# Patient Record
Sex: MALE | Race: WHITE | Employment: FULL TIME | URBAN - METROPOLITAN AREA
[De-identification: names, ages, dates, MRNs, and addresses within clinical notes are randomized per-mention and may not be internally consistent; named-entity substitution may affect disease eponyms.]

---

## 2022-06-11 ENCOUNTER — APPOINTMENT (OUTPATIENT)
Dept: GENERAL RADIOLOGY | Age: 22
End: 2022-06-11
Payer: COMMERCIAL

## 2022-06-11 ENCOUNTER — HOSPITAL ENCOUNTER (EMERGENCY)
Age: 22
Discharge: HOME | End: 2022-06-11
Attending: EMERGENCY MEDICINE
Payer: COMMERCIAL

## 2022-06-11 VITALS
HEIGHT: 74 IN | HEART RATE: 98 BPM | DIASTOLIC BLOOD PRESSURE: 76 MMHG | RESPIRATION RATE: 18 BRPM | BODY MASS INDEX: 27.56 KG/M2 | SYSTOLIC BLOOD PRESSURE: 129 MMHG | OXYGEN SATURATION: 98 % | WEIGHT: 214.73 LBS | TEMPERATURE: 100.9 F

## 2022-06-11 DIAGNOSIS — B34.9 VIRAL SYNDROME: Primary | ICD-10-CM

## 2022-06-11 DIAGNOSIS — R50.9 FEVER, UNSPECIFIED FEVER CAUSE: ICD-10-CM

## 2022-06-11 LAB — SARS-COV-2, NAAT: NOT DETECTED

## 2022-06-11 PROCEDURE — 6370000000 HC RX 637 (ALT 250 FOR IP): Performed by: EMERGENCY MEDICINE

## 2022-06-11 PROCEDURE — 87635 SARS-COV-2 COVID-19 AMP PRB: CPT

## 2022-06-11 PROCEDURE — 71046 X-RAY EXAM CHEST 2 VIEWS: CPT

## 2022-06-11 PROCEDURE — 99284 EMERGENCY DEPT VISIT MOD MDM: CPT

## 2022-06-11 RX ORDER — ONDANSETRON 4 MG/1
4 TABLET, ORALLY DISINTEGRATING ORAL ONCE
Status: COMPLETED | OUTPATIENT
Start: 2022-06-11 | End: 2022-06-11

## 2022-06-11 RX ORDER — HYDROCODONE BITARTRATE AND ACETAMINOPHEN 5; 325 MG/1; MG/1
1 TABLET ORAL EVERY 4 HOURS PRN
Qty: 15 TABLET | Refills: 0 | Status: SHIPPED | OUTPATIENT
Start: 2022-06-11 | End: 2022-06-11 | Stop reason: SDUPTHER

## 2022-06-11 RX ORDER — ACETAMINOPHEN 500 MG
1000 TABLET ORAL ONCE
Status: COMPLETED | OUTPATIENT
Start: 2022-06-11 | End: 2022-06-11

## 2022-06-11 RX ORDER — HYDROCODONE BITARTRATE AND ACETAMINOPHEN 5; 325 MG/1; MG/1
1 TABLET ORAL EVERY 4 HOURS PRN
Qty: 15 TABLET | Refills: 0 | Status: SHIPPED | OUTPATIENT
Start: 2022-06-11 | End: 2022-06-14

## 2022-06-11 RX ADMIN — ONDANSETRON 4 MG: 4 TABLET, ORALLY DISINTEGRATING ORAL at 14:25

## 2022-06-11 RX ADMIN — ACETAMINOPHEN 1000 MG: 500 TABLET ORAL at 14:32

## 2022-06-11 ASSESSMENT — PAIN SCALES - GENERAL
PAINLEVEL_OUTOF10: 5
PAINLEVEL_OUTOF10: 5
PAINLEVEL_OUTOF10: 0
PAINLEVEL_OUTOF10: 5

## 2022-06-11 ASSESSMENT — PAIN DESCRIPTION - DESCRIPTORS: DESCRIPTORS: ACHING

## 2022-06-11 ASSESSMENT — PAIN - FUNCTIONAL ASSESSMENT: PAIN_FUNCTIONAL_ASSESSMENT: 0-10

## 2022-06-11 ASSESSMENT — PAIN DESCRIPTION - LOCATION: LOCATION: HEAD

## 2022-06-11 NOTE — ED TRIAGE NOTES
Pt presents to ED ambulatory with father with c/o of headaches, congestion, and fever x4 days. Has not been tested for COVID. Reports only medication taken today is 500mg tylenol at 0500 this morning. +febrile in ED. All other vitals stable. Resp even and unlabored. A/ox4. No acute distress noted. Denies any need at this time. Call light within reach. Bed in lowest position. Will continue to monitor.

## 2022-06-11 NOTE — ED NOTES
Pt. C/o being nauseated, afraid to take Tylenol, Dr. Gloria Paredes made aware     Caryl Guillen, NESHA  06/11/22 [id number]

## 2022-06-11 NOTE — ED PROVIDER NOTES
[address] [facility]  EMERGENCY DEPARTMENTENCOUNTER      Pt Name: Ifrah Asencio  MRN: [id number]  [place] 2000  Date ofevaluation: 6/11/2022  Provider: Vonda Schmidt MD    CHIEF COMPLAINT       Chief Complaint   Patient presents with    Fever     +congestion. x4 days. +headache. states took one 500mg tylenol at 0500 this morning       HPI    HISTORY OF PRESENT ILLNESS   (Location/Symptom, Timing/Onset,Context/Setting, Quality, Duration, Modifying Factors, Severity)  Note limiting factors. Ifrah Asencio is a 25 y.o. male who presents to the emergency department with a fever. This is a 72-year-old male who presents with a fever and body aches he has had for the last 4 days. The patient has had some congestion and headaches as well. He took some Tylenol this morning. He denies any significant cough. The patient has been tested for COVID recently that was negative. He denies any abdominal pain. He denies any other complaints. NursingNotes were reviewed. Review of Systems    REVIEW OF SYSTEMS    (2-9 systems for level 4, 10 or more for level 5)     Review of Systems   Constitutional: Positive for fever. HENT: Negative for rhinorrhea and sore throat. Eyes: Negative for redness. Respiratory: Negative for shortness of breath. Cardiovascular: Negative for chest pain. Gastrointestinal: Negative for abdominal pain. Genitourinary: Negative for flank pain. Neurological: Negative for headaches. Hematological: Negative for adenopathy. Psychiatric/Behavioral: Negative for confusion. Except as noted above the remainder of the review of systems was reviewed and negative. PAST MEDICAL HISTORY     Past Medical History:   Diagnosis Date    Headache     Ulcerative colitis ([facility], [place] [address]          SURGICALHISTORY     History reviewed. No pertinent surgical history.       CURRENT MEDICATIONS       Previous Medications    ADALIMUMAB (HUMIRA SC) (up to 7 for level 4, 8 or more for level 5)     ED Triage Vitals [06/11/22 1319]   BP Temp Temp Source Heart Rate Resp SpO2 Height Weight   124/81 (!) 101 °F (38.3 °C) Oral 92 20 98 % 6' 2\" (1.88 m) 214 lb 11.7 oz (97.4 kg)       Physical Exam:      General Appearance:  Alert, cooperative, appears stated age. Head:  Normocephalic, without obvious abnormality, atraumatic. Eyes:  conjunctiva/corneas clear, EOM's intact. Sclera anicteric. ENT:  Mucous remains are moist and pink   Neck: Supple, symmetrical, trachea midline, no adenopathy. No jugular venous distention. Lungs:    Clear to auscultation bilaterally   Chest Wall:   No pain to palpation   Heart:   Genitourinary:  Regular rate rhythm with no murmurs rubs gallops  Deferred   Abdomen:    Soft and benign. No guarding or rebound. Sent. Extremities:  No clubbing cyanosis or edema   Pulses:  Good throughout   Skin:  No rashes or lesions to exposed skin. Neurologic: Alert and oriented X 3. DIAGNOSTIC RESULTS         RADIOLOGY:   Non-plain filmimages such as CT, Ultrasound and MRI are read by the radiologist. Plain radiographic images are visualized and preliminarily interpreted by the emergency physician with the below findings:    See below    Interpretation per the Radiologist below, if available at the time ofthis note: All incidental findings were discussed with the patient. XR CHEST (2 VW)   Final Result   No significant findings in the chest.               ED BEDSIDE ULTRASOUND:   Performed by ED Physician - none    LABS:  Labs Reviewed   COVID-19, RAPID       All other labs were within normal range or not returned as of this dictation.     EMERGENCY DEPARTMENT COURSE and DIFFERENTIAL DIAGNOSIS/MDM:   Vitals:    Vitals:    06/11/22 1319   BP: 124/81   Pulse: 92   Resp: 20   Temp: (!) 101 °F (38.3 °C)   TempSrc: Oral   SpO2: 98%   Weight: 214 lb 11.7 oz (97.4 kg)   Height: 6' 2\" (1.88 m)           MDM    The patient has remained stable throughout his hospital course. He is not appear to be septic. He is well oxygenated with a normal heart rate. His work-up included a chest x-ray that shows no acute findings and a COVID-19 test that was negative. Patient also has some minimally red eyes especially on the left. This appears to be consistent with a viral syndrome with fever. He does not appear to be toxic. The patient will be discharged with some hydrocodone for his body aches and relief. He is to take Motrin as well. He was referred back to his primary physician in the next several days and instructed to return if worse. Is this patient to be included in the SEP-1 Core Measure due to severe sepsis or septic shock? No   Exclusion criteria - the patient is NOT to be included for SEP-1 Core Measure due to: Infection is not suspected      REASSESSMENT              CONSULTS:  None    PROCEDURES:  Unless otherwise noted below, none     Procedures    FINAL IMPRESSION      1. Viral syndrome    2. Fever, unspecified fever cause          DISPOSITION/PLAN   DISPOSITION Decision To Discharge 06/11/2022 02:12:59 PM      PATIENT REFERREDTO:  [address] [place] [address], [phone] [facility]  [phone]    Call in 2 days        DISCHARGEMEDICATIONS:  New Prescriptions    HYDROCODONE-ACETAMINOPHEN (NORCO) 5-325 MG PER TABLET    Take 1 tablet by mouth every 4 hours as needed for Pain for up to 3 days. Intended supply: 3 days. Take lowest dose possible to manage pain     Controlled Substances Monitoring:     No flowsheet data found.     (Please note that portions of this note were completed with a voice recognition program.  Efforts were made to edit the dictations but occasionally words are mis-transcribed.)    Chris Davis MD (electronically signed)  Attending Emergency Physician          Chris Davis MD  06/11/22 [id number]

## 2022-06-11 NOTE — ED NOTES
Pt discharged from ED to home. Pt verbalizes understanding to discharge instructions, teach back successful. Pt denies questions at this time. No acute distress noted. Resp even and unlabored. A/ox4. Pt instructed to follow-up as noted - return to ED if symptoms worsen. Pt verbalizes understanding. Discharged per ED MD with discharge instructions. Pt refuses ambulatory assistance to lobby and walks with steady gait.         Aaron Stanford RN  06/11/22 [id number]

## 2022-06-11 NOTE — LETTER
[facility] [address]  Phone: [phone]               June 11, 2022    Patient: Dea Boland   YOB: 2000   Date of Visit: 6/11/2022       To Whom It May Concern:    Dea Boland was seen and treated in our emergency department on 6/11/2022. He may return to work on 6/15/22.       Sincerely,                 Signature:__________________________________